# Patient Record
Sex: MALE | Race: OTHER | HISPANIC OR LATINO | Employment: FULL TIME | URBAN - METROPOLITAN AREA
[De-identification: names, ages, dates, MRNs, and addresses within clinical notes are randomized per-mention and may not be internally consistent; named-entity substitution may affect disease eponyms.]

---

## 2017-05-19 ENCOUNTER — HOSPITAL ENCOUNTER (EMERGENCY)
Facility: HOSPITAL | Age: 32
Discharge: HOME | End: 2017-05-19
Attending: EMERGENCY MEDICINE
Payer: COMMERCIAL

## 2017-05-19 VITALS
OXYGEN SATURATION: 99 % | TEMPERATURE: 98.4 F | RESPIRATION RATE: 17 BRPM | WEIGHT: 145 LBS | HEART RATE: 59 BPM | DIASTOLIC BLOOD PRESSURE: 66 MMHG | SYSTOLIC BLOOD PRESSURE: 121 MMHG

## 2017-05-19 DIAGNOSIS — R11.0 NAUSEA: Primary | ICD-10-CM

## 2017-05-19 DIAGNOSIS — T67.9XXA HEAT EFFECTS, INITIAL ENCOUNTER: ICD-10-CM

## 2017-05-19 LAB
ANION GAP BLD CALC-SCNC: 17 MMOL/L (ref 4–13)
BUN BLD-MCNC: 12 MG/DL (ref 5–25)
CA-I BLD-SCNC: 1.17 MMOL/L (ref 1.12–1.32)
CHLORIDE BLD-SCNC: 100 MMOL/L (ref 100–108)
CREAT BLD-MCNC: 0.9 MG/DL (ref 0.6–1.3)
GFR SERPL CREATININE-BSD FRML MDRD: >60 ML/MIN/1.73SQ M
GLUCOSE SERPL-MCNC: 113 MG/DL (ref 65–140)
HCT VFR BLD CALC: 40 % (ref 36.5–49.3)
HGB BLDA-MCNC: 13.6 G/DL (ref 12–17)
PCO2 BLD: 27 MMOL/L (ref 21–32)
POTASSIUM BLD-SCNC: 3.6 MMOL/L (ref 3.5–5.3)
SODIUM BLD-SCNC: 140 MMOL/L (ref 136–145)
SPECIMEN SOURCE: ABNORMAL

## 2017-05-19 PROCEDURE — 80047 BASIC METABLC PNL IONIZED CA: CPT

## 2017-05-19 PROCEDURE — 85014 HEMATOCRIT: CPT

## 2017-05-19 PROCEDURE — 96360 HYDRATION IV INFUSION INIT: CPT

## 2017-05-19 PROCEDURE — 99283 EMERGENCY DEPT VISIT LOW MDM: CPT

## 2017-05-19 RX ADMIN — SODIUM CHLORIDE 1000 ML: 0.9 INJECTION, SOLUTION INTRAVENOUS at 13:32

## 2019-08-03 ENCOUNTER — HOSPITAL ENCOUNTER (EMERGENCY)
Facility: HOSPITAL | Age: 34
Discharge: HOME | End: 2019-08-03
Attending: EMERGENCY MEDICINE | Admitting: EMERGENCY MEDICINE
Payer: COMMERCIAL

## 2019-08-03 VITALS
WEIGHT: 143.08 LBS | SYSTOLIC BLOOD PRESSURE: 112 MMHG | DIASTOLIC BLOOD PRESSURE: 69 MMHG | OXYGEN SATURATION: 99 % | TEMPERATURE: 98 F | RESPIRATION RATE: 16 BRPM | HEART RATE: 76 BPM

## 2019-08-03 DIAGNOSIS — K02.9 DENTAL CARIES: Primary | ICD-10-CM

## 2019-08-03 DIAGNOSIS — K04.7 DENTAL ABSCESS: ICD-10-CM

## 2019-08-03 PROCEDURE — 99282 EMERGENCY DEPT VISIT SF MDM: CPT

## 2019-08-03 PROCEDURE — 99282 EMERGENCY DEPT VISIT SF MDM: CPT | Performed by: PHYSICIAN ASSISTANT

## 2019-08-03 RX ORDER — AMOXICILLIN 500 MG/1
500 CAPSULE ORAL 3 TIMES DAILY
Qty: 30 CAPSULE | Refills: 0 | Status: SHIPPED | OUTPATIENT
Start: 2019-08-03 | End: 2019-08-13

## 2019-08-03 RX ORDER — NAPROXEN 500 MG/1
500 TABLET ORAL 2 TIMES DAILY WITH MEALS
Qty: 30 TABLET | Refills: 0 | Status: SHIPPED | OUTPATIENT
Start: 2019-08-03

## 2019-08-04 NOTE — ED PROVIDER NOTES
History  Chief Complaint   Patient presents with    Dental Pain     States tooth chipped about a year ago with dental pain since Lisandra Cage has appointment on Monday       Dental Pain   Location:  Upper  Quality:  Aching  Severity:  Moderate  Duration:  3 days  Timing:  Constant  Progression:  Waxing and waning  Context: abscess, dental caries and poor dentition    Context: cap still on, not crown fracture, not dental fracture, not enamel fracture, filling intact, not intrusion and not malocclusion        None       Past Medical History:   Diagnosis Date    Anxiety     Bipolar disease, chronic ([address] )     Depression     Renal disorder        Past Surgical History:   Procedure Laterality Date    KIDNEY STONE SURGERY         History reviewed  No pertinent family history  I have reviewed and agree with the history as documented  Social History     Tobacco Use    Smoking status: Former Smoker    Smokeless tobacco: Never Used   Substance Use Topics    Alcohol use: Yes     Comment: occ    Drug use: Yes     Types: Marijuana        Review of Systems   Constitutional: Negative for activity change, appetite change and fatigue  HENT: Negative for nosebleeds, sneezing, sore throat, trouble swallowing and voice change  Eyes: Negative for photophobia, pain and visual disturbance  Respiratory: Negative for apnea, choking and stridor  Cardiovascular: Negative for palpitations and leg swelling  Gastrointestinal: Negative for anal bleeding and constipation  Endocrine: Negative for cold intolerance, heat intolerance, polydipsia and polyphagia  Genitourinary: Negative for decreased urine volume, enuresis, frequency, genital sores and urgency  Musculoskeletal: Negative for joint swelling and myalgias  Allergic/Immunologic: Negative for environmental allergies and food allergies  Neurological: Negative for tremors, seizures, speech difficulty and weakness  Hematological: Negative for adenopathy  Psychiatric/Behavioral: Negative for behavioral problems, decreased concentration, dysphoric mood and hallucinations  All other systems reviewed and are negative  Physical Exam  Physical Exam   Constitutional: He appears well-developed and well-nourished  HENT:   Head: Normocephalic and atraumatic  Right Ear: Tympanic membrane normal    Left Ear: Tympanic membrane normal    Nose: Nose normal    Mouth/Throat: Oropharynx is clear and moist  Abnormal dentition  Dental abscesses and dental caries present  Eyes: Pupils are equal, round, and reactive to light  Conjunctivae and EOM are normal    Cardiovascular: Normal rate and regular rhythm  Pulmonary/Chest: Effort normal and breath sounds normal    Skin: Skin is warm, dry and intact  He is not diaphoretic  No pallor  Psychiatric: He has a normal mood and affect  His speech is normal and behavior is normal    Vitals reviewed        Vital Signs  ED Triage Vitals [08/03/19 2113]   Temperature Pulse Respirations Blood Pressure SpO2   98 °F (36 7 °C) 76 16 112/69 99 %      Temp Source Heart Rate Source Patient Position - Orthostatic VS BP Location FiO2 (%)   Oral Monitor Sitting Left arm --      Pain Score       8           Vitals:    08/03/19 2113   BP: 112/69   Pulse: 76   Patient Position - Orthostatic VS: Sitting         Visual Acuity      ED Medications  Medications - No data to display    Diagnostic Studies  Results Reviewed     None                 No orders to display              Procedures  Procedures       ED Course                               MDM    Disposition  Final diagnoses:   Dental caries   Dental abscess     Time reflects when diagnosis was documented in both MDM as applicable and the Disposition within this note     Time User Action Codes Description Comment    8/3/2019  9:21 PM Mayra Carol Ann Add [K02 9] Dental caries     8/3/2019  9:21 PM Mayra Maharaj Shar [K04 7] Dental abscess       ED Disposition     ED Disposition Condition Date/Time Comment    Discharge Stable Sat Aug 3, 2019  9:21 PM Mauricio  discharge to home/self care  Follow-up Information     Follow up With Specialties Details Why Lupis  Schedule an appointment as soon as possible for a visit   [address]  [address]  [phone]          Patient's Medications   Discharge Prescriptions    AMOXICILLIN (AMOXIL) 500 MG CAPSULE    Take 1 capsule (500 mg total) by mouth 3 (three) times a day for 10 days       Start Date: 8/3/2019  End Date: 8/13/2019       Order Dose: 500 mg       Quantity: 30 capsule    Refills: 0    NAPROXEN (NAPROSYN) 500 MG TABLET    Take 1 tablet (500 mg total) by mouth 2 (two) times a day with meals       Start Date: 8/3/2019  End Date: --       Order Dose: 500 mg       Quantity: 30 tablet    Refills: 0     No discharge procedures on file      ED Provider  Electronically Signed by           Nette Reyez PA-C  08/03/19 6285

## 2020-02-26 ENCOUNTER — TELEPHONE (OUTPATIENT)
Dept: FAMILY MEDICINE CLINIC | Facility: CLINIC | Age: 35
End: 2020-02-26

## 2020-02-26 NOTE — TELEPHONE ENCOUNTER
[facility] Patient on office panel with Dr Mellissa Alexander  Previous patient of Dr Mellissa Alexander  Not seen in this office

## 2020-02-28 NOTE — TELEPHONE ENCOUNTER
02/28/20 10:33 AM     Thank you for your request  Your request has been received, reviewed, and the patient chart updated  The PCP has successfully been removed with a patient attribution note  This message will now be completed      Thank you  Tam Jaime

## 2021-04-24 ENCOUNTER — HOSPITAL ENCOUNTER (EMERGENCY)
Facility: HOSPITAL | Age: 36
Discharge: HOME | End: 2021-04-24
Attending: EMERGENCY MEDICINE | Admitting: EMERGENCY MEDICINE
Payer: OTHER MISCELLANEOUS

## 2021-04-24 VITALS
WEIGHT: 152.34 LBS | OXYGEN SATURATION: 99 % | DIASTOLIC BLOOD PRESSURE: 68 MMHG | RESPIRATION RATE: 16 BRPM | HEART RATE: 61 BPM | TEMPERATURE: 97.9 F | SYSTOLIC BLOOD PRESSURE: 103 MMHG

## 2021-04-24 DIAGNOSIS — S39.011A STRAIN OF ABDOMINAL WALL, INITIAL ENCOUNTER: Primary | ICD-10-CM

## 2021-04-24 PROCEDURE — 99282 EMERGENCY DEPT VISIT SF MDM: CPT | Performed by: EMERGENCY MEDICINE

## 2021-04-24 PROCEDURE — 99283 EMERGENCY DEPT VISIT LOW MDM: CPT

## 2021-04-24 RX ORDER — IBUPROFEN 600 MG/1
600 TABLET ORAL EVERY 6 HOURS PRN
Qty: 30 TABLET | Refills: 0 | Status: SHIPPED | OUTPATIENT
Start: 2021-04-24

## 2021-04-24 RX ORDER — LIDOCAINE 50 MG/G
1 PATCH TOPICAL DAILY
Qty: 15 PATCH | Refills: 0 | Status: SHIPPED | OUTPATIENT
Start: 2021-04-24

## 2021-04-24 NOTE — ED PROVIDER NOTES
History  Chief Complaint   Patient presents with    Abdominal Pain     PT c/o abd pain started 1 week ago after lifting a heavy box at work, denies N/V/D     Patient presents with right-sided flank/abdominal pain that started about a week ago while he was at work  Patient was lifting a box when he felt the pain  States that it worsened tonight when he was lifting again  Has been trying to use a compression belt while at work to help  Has also been using topical CBD oil but this is not help much  Patient's main concern is a hernia tonight  Patient denies any bowel or bladder changes  History provided by:  Patient   used: No    Abdominal Pain  Pain location:  R flank  Pain radiates to:  Does not radiate  Pain severity:  Moderate  Onset quality:  Sudden  Duration:  1 week  Timing:  Intermittent  Progression:  Waxing and waning  Chronicity:  New  Worsened by: Movement, position changes and palpation  Associated symptoms: no chest pain, no chills, no cough, no diarrhea, no dysuria, no fever, no nausea, no shortness of breath and no vomiting        Prior to Admission Medications   Prescriptions Last Dose Informant Patient Reported? Taking?   naproxen (NAPROSYN) 500 mg tablet   No No   Sig: Take 1 tablet (500 mg total) by mouth 2 (two) times a day with meals      Facility-Administered Medications: None       Past Medical History:   Diagnosis Date    Anxiety     Bipolar disease, chronic ([facility] [address] )     Depression     Renal disorder        Past Surgical History:   Procedure Laterality Date    KIDNEY STONE SURGERY         No family history on file  I have reviewed and agree with the history as documented      E-Cigarette/Vaping     E-Cigarette/Vaping Substances     Social History     Tobacco Use    Smoking status: Former Smoker    Smokeless tobacco: Never Used   Substance Use Topics    Alcohol use: Yes     Comment: occ    Drug use: Yes     Types: Marijuana       Review of Systems Constitutional: Negative for chills and fever  Eyes: Negative for visual disturbance  Respiratory: Negative for cough, chest tightness and shortness of breath  Cardiovascular: Negative for chest pain and leg swelling  Gastrointestinal: Positive for abdominal pain  Negative for blood in stool, diarrhea, nausea and vomiting  Genitourinary: Negative for dysuria  Musculoskeletal: Negative for back pain and neck pain  Skin: Negative for color change, pallor, rash and wound  Allergic/Immunologic: Negative for immunocompromised state  Neurological: Negative for dizziness, syncope and light-headedness  All other systems reviewed and are negative  Physical Exam  Physical Exam  Vitals signs and nursing note reviewed  Constitutional:       General: He is not in acute distress  Appearance: He is well-developed  He is not diaphoretic  HENT:      Head: Normocephalic and atraumatic  Right Ear: External ear normal       Left Ear: External ear normal       Nose: No congestion or rhinorrhea  Eyes:      General: No scleral icterus  Conjunctiva/sclera: Conjunctivae normal       Right eye: Right conjunctiva is not injected  Left eye: Left conjunctiva is not injected  Neck:      Musculoskeletal: Normal range of motion and neck supple  Cardiovascular:      Rate and Rhythm: Normal rate and regular rhythm  Pulses: Normal pulses  Pulmonary:      Effort: Pulmonary effort is normal  No respiratory distress  Breath sounds: No stridor  Abdominal:      General: There is no distension  Palpations: Abdomen is soft  There is no mass  Tenderness: There is abdominal tenderness  There is no guarding or rebound  Negative signs include Bai's sign and McBurney's sign  Hernia: No hernia is present  Musculoskeletal:      Right lower leg: No edema  Left lower leg: No edema  Skin:     General: Skin is warm and dry        Capillary Refill: Capillary refill takes less than 2 seconds  Coloration: Skin is not pale  Findings: No erythema or rash  Neurological:      Mental Status: He is alert and oriented to person, place, and time  Motor: No abnormal muscle tone  Psychiatric:         Mood and Affect: Mood normal          Behavior: Behavior normal          Vital Signs  ED Triage Vitals [04/24/21 0310]   Temperature Pulse Respirations Blood Pressure SpO2   97 9 °F (36 6 °C) 61 16 103/68 99 %      Temp Source Heart Rate Source Patient Position - Orthostatic VS BP Location FiO2 (%)   Oral Monitor Sitting Right arm --      Pain Score       3           Vitals:    04/24/21 0310   BP: 103/68   Pulse: 61   Patient Position - Orthostatic VS: Sitting         Visual Acuity      ED Medications  Medications - No data to display    Diagnostic Studies  Results Reviewed     None                 No orders to display              Procedures  Procedures         ED Course                                           MDM  Number of Diagnoses or Management Options  Strain of abdominal wall, initial encounter: new and requires workup  Diagnosis management comments: Patient presents with what appears to be an oblique strain from work  Pain is reproducible when I palpate over the area, when he sits up and lays down and when he turns  Patient was concern for hernia but I do not feel this on exam   Advised supportive care with compression belts, topical anti-inflammatory such as Voltaren gel, icy Hot, Tiger balm, etcetera  Also will write him a prescription for Lidoderm patches and extra-strength Motrin  Will have him follow-up with his workman's comp physician if symptoms are still persisting and or sports medicine  Patient understands and agrees with plan of care  Questions and concerns answered         Amount and/or Complexity of Data Reviewed  Review and summarize past medical records: yes    Patient Progress  Patient progress: stable      Disposition  Final diagnoses:   Strain of abdominal wall, initial encounter     Time reflects when diagnosis was documented in both MDM as applicable and the Disposition within this note     Time User Action Codes Description Comment    4/24/2021  4:39 AM Ana Lilia Clay Add [S39 [id number]] Strain of abdominal wall, initial encounter       ED Disposition     ED Disposition Condition Date/Time Comment    Discharge Stable Sat Apr 24, 2021  4:36 AM Titus Moreland discharge to home/self care  Follow-up Information     Follow up With Specialties Details Why Sagar Pete MD Sports Medicine, Orthopedic Surgery Call in 2 weeks If symptoms persist, To schedule an appointment as soon as you can Krish Sharifnahed  [address]            Patient's Medications   Discharge Prescriptions    IBUPROFEN (MOTRIN) 600 MG TABLET    Take 1 tablet (600 mg total) by mouth every 6 (six) hours as needed for moderate pain       Start Date: 4/24/2021 End Date: --       Order Dose: 600 mg       Quantity: 30 tablet    Refills: 0    LIDOCAINE (LIDODERM) 5 %    Apply 1 patch topically daily Remove & Discard patch within 12 hours or as directed by MD       Start Date: 4/24/2021 End Date: --       Order Dose: 1 patch       Quantity: 15 patch    Refills: 0     No discharge procedures on file      PDMP Review     None          ED Provider  Electronically Signed by           Loida Longoria DO  04/24/21 0230

## 2021-08-28 ENCOUNTER — HOSPITAL ENCOUNTER (EMERGENCY)
Facility: HOSPITAL | Age: 36
Discharge: HOME | End: 2021-08-28
Attending: EMERGENCY MEDICINE
Payer: COMMERCIAL

## 2021-08-28 VITALS
TEMPERATURE: 98.2 F | OXYGEN SATURATION: 99 % | DIASTOLIC BLOOD PRESSURE: 87 MMHG | RESPIRATION RATE: 17 BRPM | HEART RATE: 67 BPM | SYSTOLIC BLOOD PRESSURE: 168 MMHG

## 2021-08-28 DIAGNOSIS — Z11.52 ENCOUNTER FOR SCREENING FOR COVID-19: ICD-10-CM

## 2021-08-28 DIAGNOSIS — J06.9 URI (UPPER RESPIRATORY INFECTION): Primary | ICD-10-CM

## 2021-08-28 LAB — SARS-COV-2 RNA RESP QL NAA+PROBE: NEGATIVE

## 2021-08-28 PROCEDURE — U0003 INFECTIOUS AGENT DETECTION BY NUCLEIC ACID (DNA OR RNA); SEVERE ACUTE RESPIRATORY SYNDROME CORONAVIRUS 2 (SARS-COV-2) (CORONAVIRUS DISEASE [COVID-19]), AMPLIFIED PROBE TECHNIQUE, MAKING USE OF HIGH THROUGHPUT TECHNOLOGIES AS DESCRIBED BY CMS-2020-01-R: HCPCS | Performed by: INTERNAL MEDICINE

## 2021-08-28 PROCEDURE — 99283 EMERGENCY DEPT VISIT LOW MDM: CPT

## 2021-08-28 PROCEDURE — U0005 INFEC AGEN DETEC AMPLI PROBE: HCPCS | Performed by: INTERNAL MEDICINE

## 2021-08-28 PROCEDURE — 99282 EMERGENCY DEPT VISIT SF MDM: CPT | Performed by: EMERGENCY MEDICINE

## 2021-08-28 NOTE — Clinical Note
Chaya Padilla was seen and treated in our emergency department on 8/28/2021  Diagnosis: covid testing    Allan    He may return on this date:     Do not return to work unless your test is negative  We will call you on 8/29  If you do have covid, stay home for 10 days     If you have any questions or concerns, please don't hesitate to call        Shayna Jarvis MD    ______________________________           _______________          _______________  [facility] Representative                              Date                                Time

## 2021-08-29 NOTE — DISCHARGE INSTRUCTIONS
Patient was seen in the ED today for a viral upper respiratory illness  Symptomatic therapy we discussed included push fluids, rest and use acetaminophen, ibuprofen prn for fevers and body aches  Lack of antibiotic effectiveness discussed with patient/family  Call or return to clinic prn if these symptoms worsen or fail to improve as anticipated

## 2021-08-29 NOTE — ED ATTENDING ATTESTATION
8/28/2021  IXiomara DO, saw and evaluated the patient  I have discussed the patient with the resident/non-physician practitioner and agree with the resident's/non-physician practitioner's findings, Plan of Care, and MDM as documented in the resident's/non-physician practitioner's note, except where noted  All available labs and Radiology studies were reviewed  I was present for key portions of any procedure(s) performed by the resident/non-physician practitioner and I was immediately available to provide assistance  At this point I agree with the current assessment done in the Emergency Department  I have conducted an independent evaluation of this patient a history and physical is as follows:    ED Course         Critical Care Time  Procedures    27-year-old male presents to the emergency department requesting testing for COVID  He states his boss was diagnosed few days ago  He has had a sore throat  No fevers or cough  On exam he appears well in no distress  Pharynx is normal   Lungs are clear  Will rule out COVID  Patient is not vaccinated

## 2021-08-29 NOTE — ED NOTES
Pt states he has had a sore throat since last Sunday    +burning feeling on tongue     Karen Veloz, ERIN  08/28/21 9943

## 2021-08-29 NOTE — ED PROVIDER NOTES
HPI: Patient is a 29 y o  male who presents with 3 days of cough and sore throat which the patient describes at mild  Patient reports he notices it most when he smoking is medical marijuana  He reports he is tolerating his secretions normally and able to swallow liquids and solids  He denies shortness of breath or chest pain  He denies muscle aches or fatigue  The patient has had contact with people with similar symptoms  The patient has not taken any medication  No Known Allergies    Past Medical History:   Diagnosis Date    Anxiety     Bipolar disease, chronic (HCC)     Depression     Renal disorder       Past Surgical History:   Procedure Laterality Date    KIDNEY STONE SURGERY       Social History     Tobacco Use    Smoking status: Former Smoker    Smokeless tobacco: Never Used   Substance Use Topics    Alcohol use: Yes     Comment: occ    Drug use: Yes     Types: Marijuana       Nursing notes reviewed  Physical Exam:  ED Triage Vitals   Temperature Pulse Respirations Blood Pressure SpO2   08/28/21 2125 08/28/21 2125 08/28/21 2125 08/28/21 2127 08/28/21 2125   98 2 °F (36 8 °C) 67 17 168/87 99 %      Temp Source Heart Rate Source Patient Position - Orthostatic VS BP Location FiO2 (%)   08/28/21 2125 08/28/21 2125 08/28/21 2125 08/28/21 2125 --   Oral Monitor Sitting Right arm       Pain Score       --                  ROS: Positive for cough and sore throat, the remainder of a 10 organ system ROS was otherwise unremarkable      General: awake, alert, no acute distress  Head: normocephalic, atraumatic  Eyes: no scleral icterus  Ears: external ears normal, hearing grossly intact  Mouth:  No oropharyngeal edema, erythema, no tonsillar exudate  Nose: external exam grossly normal, negative nasal discharge  Neck: symmetric, No JVD noted, trachea midline  Pulmonary: no respiratory distress, no tachypnea noted  Cardiovascular: appears well perfused  Abdomen: no distention noted  Musculoskeletal: no deformities noted, tone normal  Neuro: grossly non-focal  Psych: mood and affect appropriate    The patient is stable and has a history and physical exam consistent with a viral illness  COVID19 testing has been performed  I considered the patient's other medical conditions as applicable/noted above in my medical decision making  The patient is stable upon discharge  The plan is for supportive care at home  The patient (and any family present) verbalized understanding of the discharge instructions and warnings that would necessitate return to the Emergency Department  All questions were answered prior to discharge  Medications - No data to display  Final diagnoses:   URI (upper respiratory infection)   Encounter for screening for COVID-19     Time reflects when diagnosis was documented in both MDM as applicable and the Disposition within this note     Time User Action Codes Description Comment    8/28/2021  9:33 PM Italo Myles [J06 9] URI (upper respiratory infection)     8/28/2021  9:33 PM Italo Myles [[id number]] Encounter for screening for COVID-19       ED Disposition     ED Disposition Condition Date/Time Comment    Discharge Stable Sat Aug 28, 2021  9:33 PM Jaye Concepcion discharge to home/self care  Follow-up Information     Follow up With Specialties Details Why Contact Info    Sloan Tierney MD [facility] Medicine Call  If symptoms worsen [address] [phone]          Patient's Medications   Discharge Prescriptions    No medications on file     No discharge procedures on file      Electronically Signed by       Ziggy Ornelas MD  08/28/21 4513